# Patient Record
Sex: FEMALE | Race: WHITE | Employment: FULL TIME | URBAN - METROPOLITAN AREA
[De-identification: names, ages, dates, MRNs, and addresses within clinical notes are randomized per-mention and may not be internally consistent; named-entity substitution may affect disease eponyms.]

---

## 2018-06-15 ENCOUNTER — HOSPITAL ENCOUNTER (OUTPATIENT)
Dept: PHYSICAL THERAPY | Age: 48
Discharge: HOME | End: 2018-06-15
Payer: OTHER GOVERNMENT

## 2018-06-15 PROCEDURE — 97161 PT EVAL LOW COMPLEX 20 MIN: CPT

## 2018-06-15 PROCEDURE — 97110 THERAPEUTIC EXERCISES: CPT

## 2018-06-15 NOTE — PROGRESS NOTES
PT CERVICAL EVAL AND TREATMENT     Patient Name: Yuan Charles  Date:6/15/2018  : 1970  [x]  Patient  Verified  Payor: NARENDRA / Plan: Samuel Wright 74 / Product Type: [employer] Vera /    In time:939  Out time:1014  Total Treatment Time (min): 35   Visit #: 1 of 8-12    Treatment Area: Acute neck pain [M54.2]    SUBJECTIVE  Pain Level (0-10 scale): (C): 0  (B):0  (W): 7   Any medication changes, allergies to medications, diagnosis change, or new procedure performed: see summary sheet for update    Chief Complaint:  Patient reports with co L sided CS/ scapular pain with hx of \"pinched nerve\" starting May 10, 2018 insidious onset with progressive improvements. Patient is interesting in preventative measure to prevent exacerbation of pain. PMH  Significant for R sided CS pain 5 years ago. No imaging to date of CS. Current pain management : motrin as needed and heat as needed. Patient is R hand dominant     Mechanism of injury: insidious     Functional Status  Present functional limitations:   work: travel and airplane, pain with sitting - ie in car where shoulders are pushed forward, rec activities: UE workouts , avoids lifting >10 lbs with L UE. Symptoms  Aggravated by: see limitations    Eased by: as above     Past History/Treatments: PT for other issues     Diagnostic Tests: none     Headaches: Do you have headaches? No     OBJECTIVE  Posture: slight fwd head     Shoulder/Scapular Screen: WNL     Active Movements:   Dec 25% L rotation and SB   [place] AROM WNL no pain      Thoracic Spine:  WNL rotation     Palpation: denies TTP , but moderate latent  trigger point of L mid trap     Special Tests:  Cervical:        Spurling's:  [] R    [] L    [] +    [x] -       Distraction:  [] R    [] L    [] +    [x] -       Compression: [] R    [] L    [] +    [x] -  Muscle Flexibility: WNL   Global Muscular Weakness:    L Shoulder : Flexion 4+/5, ABD 4+/5 (R 5/5 )   Middle Trapezius/ Rhomboids 5/5   Lower Trapezius 4/5 L     Other tests/comments:       Patient Education/ Therapeutic Exercise : [x] Discussed POT including PT expectation, established HEP with pictures and instruction, per FS   (minutes) : 19    Manual: NI    Modality (rationale): PD     Pain Level (0-10 scale) post treatment: 0    ASSESSMENT  [x]  See Plan of Care    PLAN  [x]  Upgrade activities as tolerated      [x] Other:_POC  2-3 x 8-12    Amparo Arguelles, PT 6/15/2018    Justification for Eval Code Complexity:  Patient History : none noted   Examination see exam   Clinical Presentation: stable   Clinical Decision Making : FOTO : 50 /100

## 2018-06-15 NOTE — PROGRESS NOTES
[address] [facility] AT [address] [place] [address]  Phone: [phone] Fax: [phone] / [address] [address] PHYSICAL THERAPY SERVICES  Patient Name: Liv Mehta : 1970   Medical   Diagnosis: Acute neck pain [M54.2] Treatment Diagnosis: CS and TS    Onset Date: May 10, 2018     Referral Source: Roge Ruggiero DO Start of Care [facility]): 6/15/2018   Prior Hospitalization: See medical history Provider #: [id number]   Prior Level of Function: Functional I , hx R CS pain    Comorbidities: MS   Medications: Verified on Patient Summary List   The Plan of Care and following information is based on the information from the initial evaluation.   ========================================================================  Assessment / key information:  Pt is a 46y.o. year old female who presents with co L sided CS/ scapular pain with hx of \"pinched nerve\" starting May 10, 2018 insidious onset with progressive improvements. Patient is interesting in preventative measure to prevent exacerbation of pain. PMH  Significant for R sided CS pain 5 years ago. No imaging to date of CS. Current pain management : motrin as needed and heat as needed. Patient is R hand dominant. Current deficits include: increased pain to 7/10 at worst, decreased CS rotation and SB to L 25%, decreased postural strength for LT 4/5, and L shoulder flexion / ABD 4+/5 from non use. Functional deficits include: work: travel and airplane, pain with sitting - ie in car where shoulders are pushed forward, rec activities: UE workouts , avoids lifting >10 lbs with L UE. [facility] program initiated on initial evaluation to address these deficits.  Pt would benefit from PT to address these deficits for increased functional mobility and QOL.  ========================================================================  Eval Complexity: History: MEDIUM  Complexity : 1-2 comorbidities / personal factors will impact the outcome/ POC Exam:HIGH Complexity : 4+ Standardized tests and measures addressing body structure, function, activity limitation and / or participation in recreation  Presentation: LOW Complexity : Stable, uncomplicated  Clinical Decision Making:MEDIUM Complexity : FOTO score of 26-74Overall Complexity:LOW   Problem List: decrease ROM, decrease strength, decrease flexibility/ joint mobility and other FOTO 50/100   Treatment Plan may include any combination of the following: Therapeutic exercise, Therapeutic activities, Neuromuscular re-education, Physical agent/modality, Manual therapy, Patient education and Functional mobility training  Patient / Family readiness to learn indicated by: asking questions, trying to perform skills and interest  Persons(s) to be included in education: patient (P)  Barriers to Learning/Limitations: None  Measures taken:    Patient Goal (s): \"learn stretches to allevaite tingling/ pain\"   Patient self reported health status: good  Rehabilitation Potential: good   Short Term Goals: To be accomplished in  1  weeks:  1. Pt will be independent and compliant with HEP   Long Term Goals: To be accomplished in  8-12  treatments:  1. Patient will increase FOTO score to 67/100 for indications of increased functional mobility. 2.  Patient will demo full CS L rotation for B symm with driving activities   3. Patient will demo 4+/5 LT strength for progression of postural strength with prolonged plane trips   4. Patient will demo 10# lift and carry for progress to PLOF such as picking up milk jug.   Frequency / Duration:   Patient to be seen  2-3  times per week for 8-12  treatments:  Patient / Caregiver education and instruction: self care, activity modification and exercises  G-Codes (GP): ERIC  Therapist Signature: Matt Melton PT Date: 2/40/8994   Certification Period: ERIC Time: 1031am ========================================================================  I certify that the above Physical Therapy Services are being furnished while the patient is under my care. I agree with the treatment plan and certify that this therapy is necessary. Physician Signature:        Date:       Time:   Please sign and return to [facility] or you may fax the signed copy to [phone]. Thank you.

## 2018-06-20 ENCOUNTER — HOSPITAL ENCOUNTER (OUTPATIENT)
Dept: PHYSICAL THERAPY | Age: 48
Discharge: HOME | End: 2018-06-20
Payer: OTHER GOVERNMENT

## 2018-06-20 PROCEDURE — 97110 THERAPEUTIC EXERCISES: CPT

## 2018-06-20 PROCEDURE — 97140 MANUAL THERAPY 1/> REGIONS: CPT

## 2018-06-20 NOTE — PROGRESS NOTES
PT DAILY TREATMENT NOTE     Patient Name: Yaw Mayfield  Date:2018  : 1970  [x]  Patient  Verified  Payor:  / Plan: [facility] REGION / Product Type:  /    In time:658  Out time:743  Total Treatment Time (min): 45  Visit #: 2 of -    Treatment Area: Acute neck pain [M54.2]    SUBJECTIVE  Pain Level (0-10 scale): 0  Any medication changes, allergies to medications, adverse drug reactions, diagnosis change, or new procedure performed?: [x] No    [] Yes (see summary sheet for update)  Subjective functional status/changes:   [] No changes reported  \"I am doing great. \"    OBJECTIVE  Modality rationale: PD   Min Type Additional Details    [] Estim: []Att   []Unatt        []TENS instruct                  []IFC  []Premod   []NMES                     []Other:  []w/US   []w/ice   []w/heat  Position:  Location:    []  Traction: [] Cervical       []Lumbar                       [] Prone          []Supine                       []Intermittent   []Continuous Lbs:  [] before manual  [] after manual    []  Ultrasound: []Continuous   [] Pulsed                           []1MHz   []3MHz Location:  W/cm2:    []  Iontophoresis with dexamethasone         Location: [] Take home patch   [] In clinic    []  Ice     []  heat  []  Ice massage Position:  Location:    []  Vasopneumatic Device Pressure:       [] lo [] med [] hi   Temperature: [] lo [] med [] hi   [x] Skin assessment post-treatment:  [x]intact []redness- no adverse reaction       []redness - adverse reaction:       37 min Therapeutic Exercise:  [x] See flow sheet :Initiated ther ex per flow sheet    Rationale: increase ROM, increase strength and improve posture to improve the patients ability to decrease pain with activity and decrease reoccurrence of severe pain     8 min Manual Therapy:  SOR, CS rotation mobs B, CS CPA mobs    Rationale: decrease pain and increase ROM to improve functional CS mobility     x min Patient Education: [x] Review HEP from IE and updated sec 1x per week - see hard chart         Other Objective/Functional Measures: Therex initiated today - first FU post eval.     CS rotation limited to L with thread the needle    Pain Level (0-10 scale) post treatment: 0    ASSESSMENT/Changes in Function: Patient tolerated initiation of therex well. 100% VC for form and technique for all newly introduced therex. Reviewed HEP given at IE to ensure form as well and patient denies questions or concerns. Initiated manual to address limited in CS ROM. Patient has point tenderness to R CS transverse process at C3-4 - no trigger point noted. Patient will continue to benefit from skilled PT services to modify and progress therapeutic interventions, address functional mobility deficits, address ROM deficits, address strength deficits, analyze and address soft tissue restrictions, analyze and cue movement patterns, analyze and modify body mechanics/ergonomics and assess and modify postural abnormalities to attain remaining goals. [x]  See Plan of Care  []  See progress note/recertification  []  See Discharge Summary         Progress towards goals / Updated goals:  FIRST FU TREATMENT - CONT PER POT TO EST GOALS 6/20/2018   · Short Term Goals: To be accomplished in  1  weeks:  1. Pt will be independent and compliant with HEP  · Long Term Goals: To be accomplished in  8-12  treatments:  1. Patient will increase FOTO score to 67/100 for indications of increased functional mobility. 2.  Patient will demo full CS L rotation for B symm with driving activities   3. Patient will demo 4+/5 LT strength for progression of postural strength with prolonged plane trips   4. Patient will demo 10# lift and carry for progress to PLOF such as picking up milk jug.     PLAN  [x]  Upgrade activities as tolerated     []  Continue plan of care  [x]  Update interventions per flow sheet       []  Discharge due to:_  [x]  Other:_assess response to first FU treatment       April Rich, PT 6/20/2018

## 2018-06-29 ENCOUNTER — HOSPITAL ENCOUNTER (OUTPATIENT)
Dept: PHYSICAL THERAPY | Age: 48
Discharge: HOME | End: 2018-06-29
Payer: OTHER GOVERNMENT

## 2018-06-29 PROCEDURE — 97140 MANUAL THERAPY 1/> REGIONS: CPT

## 2018-06-29 PROCEDURE — 97110 THERAPEUTIC EXERCISES: CPT

## 2018-06-29 NOTE — PROGRESS NOTES
PT DAILY TREATMENT NOTE     Patient Name: Hugh Patch  Date:2018  : 1970  [x]  Patient  Verified  Payor:  / Plan: [facility] REGION / Product Type:  /    In time:735 Out time:810  Total Treatment Time (min): 35  Visit #: 3 of -12    Treatment Area: Acute neck pain [M54.2]    SUBJECTIVE  Pain Level (0-10 scale): 0  Any medication changes, allergies to medications, adverse drug reactions, diagnosis change, or new procedure performed?: [x] No    [] Yes (see summary sheet for update)  Subjective functional status/changes:   [] No changes reported  \"Everything is going well. \"    OBJECTIVE  Modality rationale: PD   Min Type Additional Details    [] Estim: []Att   []Unatt        []TENS instruct                  []IFC  []Premod   []NMES                     []Other:  []w/US   []w/ice   []w/heat  Position:  Location:    []  Traction: [] Cervical       []Lumbar                       [] Prone          []Supine                       []Intermittent   []Continuous Lbs:  [] before manual  [] after manual    []  Ultrasound: []Continuous   [] Pulsed                           []1MHz   []3MHz Location:  W/cm2:    []  Iontophoresis with dexamethasone         Location: [] Take home patch   [] In clinic    []  Ice     []  heat  []  Ice massage Position:  Location:    []  Vasopneumatic Device Pressure:       [] lo [] med [] hi   Temperature: [] lo [] med [] hi   [x] Skin assessment post-treatment:  [x]intact []redness- no adverse reaction       []redness - adverse reaction:       27 min Therapeutic Exercise:  [x] See flow sheet    Rationale: increase ROM, increase strength and improve posture to improve the patients ability to decrease pain with activity and decrease reoccurrence of severe pain     8 min Manual Therapy:  SOR, CS rotation mobs B, CS CPA mobs    Rationale: decrease pain and increase ROM to improve functional CS mobility     x min Patient Education: [x] Review HEP from IE and updated sec 1x per week - see hard chart         Other Objective/Functional Measures:      HEP compliance :good    Pain Level (0-10 scale) post treatment: 0    ASSESSMENT/Changes in Function: Patient reports first FU session did not cause adverse reaction. No questions about previous HEP. Patient challenged by all  New resistance therex sec to poor UE gross strength. Patient educated on DOMS. Patient educated on upcoming reassessment next week for likely DC. Patient will continue to benefit from skilled PT services to modify and progress therapeutic interventions, address functional mobility deficits, address ROM deficits, address strength deficits, analyze and address soft tissue restrictions, analyze and cue movement patterns, analyze and modify body mechanics/ergonomics and assess and modify postural abnormalities to attain remaining goals. [x]  See Plan of Care  []  See progress note/recertification  []  See Discharge Summary         Progress towards goals / Updated goals: · Short Term Goals: To be accomplished in  1  weeks:  1. Pt will be independent and compliant with HEP - Goal progressing - HEP est with no questions. HEP will be modified and progressed as appropriate 6/29/18  · Long Term Goals: To be accomplished in  8-12  treatments:  1. Patient will increase FOTO score to 67/100 for indications of increased functional mobility. 2.  Patient will demo full CS L rotation for B symm with driving activities   3. Patient will demo 4+/5 LT strength for progression of postural strength with prolonged plane trips   4. Patient will demo 10# lift and carry for progress to PLOF such as picking up milk jug.     PLAN  [x]  Upgrade activities as tolerated     []  Continue plan of care  [x]  Update interventions per flow sheet       []  Discharge due to:_  [x]  Other:_one  More apt scheduled next week - DC at this time     Pat Rose, PT 6/29/2018

## 2018-07-03 ENCOUNTER — HOSPITAL ENCOUNTER (OUTPATIENT)
Dept: PHYSICAL THERAPY | Age: 48
Discharge: HOME | End: 2018-07-03
Payer: OTHER GOVERNMENT

## 2018-07-03 PROCEDURE — 97110 THERAPEUTIC EXERCISES: CPT

## 2018-07-03 NOTE — PROGRESS NOTES
[address] [facility] AT [address] [place] [address], [facility], [place]  Phone: [phone] Fax [phone] SUMMARY  Patient Name: Mellissa Marcial : 1970   Treatment/Medical Diagnosis: Acute neck pain [M54.2]   Referral Source: Mony Welch DO     Date of Initial Visit: 6/15/18 Attended Visits: 4 Missed Visits: 0     SUMMARY OF TREATMENT  Patient was being treated for co L sided CS/ scapular pain with hx of \"pinched nerve\" starting May 10, 2018 insidious onset with progressive improvements. Treatment included progressive therex for HEP progression for postural strength and endurance,   CURRENT STATUS  Patient made excellent progress with PT. Patient reports no pain and 100% Improvement in sx. Patient has all the tools and knowledge needed to continue progress via HEP at this time. Assessment as follows:  Pain at best 0, at worst 0  Subjective % improvement 100%  Objective:                         CS AROM; WNL all direction , no pain                         Strength : MT 5/5, LT 4/5                        Lifting WNL no pain   Improvements: progression of ROM and strength, no pain in last 4 visits, fully functional, HEP established   Deficits no noted   Progress towards goals / Updated goals: · Short Term Goals: To be accomplished in  1  weeks:  1.  Pt will be independent and compliant with HEP - Goal met - patient IND with DC HEP   · Long Term Goals: To be accomplished in  8-12  treatments:  1.  Patient will increase FOTO score to 67/100 for indications of increased functional mobility.  goal met at 75/100  2.  Patient will demo full CS L rotation for B symm with driving activities goal met - B symm with no pain   3. Patient will demo 4+/5 LT strength for progression of postural strength with prolonged plane trips goal progressing - MMT 4/5 still,  However NM recruitment improved   4.  Patient will demo 10# lift and carry for progress to PLOF such as picking up milk jug.goal met - patient demo 10# lift with no pain    RECOMMENDATIONS  Discontinue therapy. Progressing towards or have reached established goals. Gcode: NA  If you have any questions/comments please contact us directly at [phone]. Thank you for allowing us to assist in the care of your patient.   Therapist Signature: Ruben Kimble, PT Date: 7/3/18   Reporting Period: NA Time: 510pm

## 2018-07-03 NOTE — PROGRESS NOTES
PT DAILY TREATMENT NOTE     Patient Name: Jesica Chi  Date:7/3/2018  : 1970  [x]  Patient  Verified  Payor:  / Plan: Ajith Nguyen / Product Type:  /    In time:440 Out time:505  Total Treatment Time (min): 25  Visit #: 4 of 4-12    Treatment Area: Acute neck pain [M54.2]    SUBJECTIVE  Pain Level (0-10 scale): 0  Any medication changes, allergies to medications, adverse drug reactions, diagnosis change, or new procedure performed?: [x] No    [] Yes (see summary sheet for update)  Subjective functional status/changes:   [] No changes reported  \"No pain at all.  Doing great\"    OBJECTIVE  Modality rationale: PD   Min Type Additional Details    [] Estim: []Att   []Unatt        []TENS instruct                  []IFC  []Premod   []NMES                     []Other:  []w/US   []w/ice   []w/heat  Position:  Location:    []  Traction: [] Cervical       []Lumbar                       [] Prone          []Supine                       []Intermittent   []Continuous Lbs:  [] before manual  [] after manual    []  Ultrasound: []Continuous   [] Pulsed                           []1MHz   []3MHz Location:  W/cm2:    []  Iontophoresis with dexamethasone         Location: [] Take home patch   [] In clinic    []  Ice     []  heat  []  Ice massage Position:  Location:    []  Vasopneumatic Device Pressure:       [] lo [] med [] hi   Temperature: [] lo [] med [] hi   [x] Skin assessment post-treatment:  [x]intact []redness- no adverse reaction       []redness - adverse reaction:       25 min Therapeutic Exercise:  [x] See flow sheet: reassess - completed FOTO with patient as needed     Rationale: increase ROM, increase strength and improve posture to improve the patients ability to decrease pain with activity and decrease reoccurrence of severe pain     NT min Manual Therapy:  SOR, CS rotation mobs B, CS CPA mobs    Rationale: decrease pain and increase ROM to improve functional CS mobility     x min Patient Education: [x] Review HEP for DC     Other Objective/Functional Measures:      Goals assessed for DC  Pain at best 0, at worst 0  Subjective % improvement 100%  Objective:    CS AROM; WNL all direction , no pain    Strength : MT 5/5, LT 4/5   Lifting WNL no pain   Improvements: progression of ROM and strength, no pain in last 4 visits, fully functional, HEP established   Deficits no noted       Pain Level (0-10 scale) post treatment: 0    ASSESSMENT/Changes in Function:   [x]  See Discharge Summary         Progress towards goals / Updated goals: · Short Term Goals: To be accomplished in  1  weeks:  1. Pt will be independent and compliant with HEP - Goal met - patient IND with DC HEP   · Long Term Goals: To be accomplished in  8-12  treatments:  1. Patient will increase FOTO score to 67/100 for indications of increased functional mobility.  goal met at 75/100  2. Patient will demo full CS L rotation for B symm with driving activities goal met - B symm with no pain   3. Patient will demo 4+/5 LT strength for progression of postural strength with prolonged plane trips goal progressing - MMT 4/5 still,  However NM recruitment improved   4.  Patient will demo 10# lift and carry for progress to PLOF such as picking up milk jug.goal met - patient demo 10# lift with no pain    PLAN     [x]  Discharge due to:_program complete, goals met /progressing       Jolynn Montero, PT 7/3/2018